# Patient Record
Sex: FEMALE | Race: WHITE
[De-identification: names, ages, dates, MRNs, and addresses within clinical notes are randomized per-mention and may not be internally consistent; named-entity substitution may affect disease eponyms.]

---

## 2018-03-24 ENCOUNTER — HOSPITAL ENCOUNTER (OUTPATIENT)
Dept: HOSPITAL 80 - FIMAGING | Age: 73
End: 2018-03-24
Attending: NURSE PRACTITIONER
Payer: COMMERCIAL

## 2018-03-24 DIAGNOSIS — M25.551: Primary | ICD-10-CM

## 2018-03-24 DIAGNOSIS — M25.451: ICD-10-CM

## 2018-03-24 DIAGNOSIS — Z98.890: ICD-10-CM

## 2018-03-24 DIAGNOSIS — M79.1: ICD-10-CM

## 2018-03-24 DIAGNOSIS — M25.452: ICD-10-CM

## 2018-04-16 ENCOUNTER — HOSPITAL ENCOUNTER (OUTPATIENT)
Dept: HOSPITAL 80 - FIMAGING | Age: 73
End: 2018-04-16
Attending: RADIOLOGY
Payer: COMMERCIAL

## 2018-04-16 DIAGNOSIS — M25.551: Primary | ICD-10-CM

## 2018-05-14 ENCOUNTER — HOSPITAL ENCOUNTER (OUTPATIENT)
Dept: HOSPITAL 80 - FIMAGING | Age: 73
End: 2018-05-14
Attending: RADIOLOGY
Payer: COMMERCIAL

## 2018-05-14 DIAGNOSIS — M25.551: Primary | ICD-10-CM

## 2018-05-14 PROCEDURE — 3E023KZ INTRODUCTION OF OTHER DIAGNOSTIC SUBSTANCE INTO MUSCLE, PERCUTANEOUS APPROACH: ICD-10-PCS | Performed by: RADIOLOGY

## 2018-06-13 ENCOUNTER — HOSPITAL ENCOUNTER (EMERGENCY)
Dept: HOSPITAL 80 - FED | Age: 73
Discharge: HOME | End: 2018-06-13
Payer: COMMERCIAL

## 2018-06-13 VITALS — DIASTOLIC BLOOD PRESSURE: 69 MMHG | SYSTOLIC BLOOD PRESSURE: 172 MMHG
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
## 2018-06-13 DIAGNOSIS — Z79.82: ICD-10-CM

## 2018-06-13 DIAGNOSIS — I10: ICD-10-CM

## 2018-06-13 DIAGNOSIS — S09.90XA: Primary | ICD-10-CM
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
## 2018-06-13 NOTE — EDPHY
H & P


Stated Complaint: Fell and hit left side of face, injury to face and left knee.


Time Seen by Provider: 06/13/18 14:04


HPI/ROS: 





CHIEF COMPLAINT:  Frontal head injury, post mechanical fall





HISTORY OF PRESENT ILLNESS:  73-year-old female arrives via private vehicle 

after she tripped on her driveway impacting the left frontal region of her 

head.  No loss of consciousness.  No seizure activity.  No history of anti 

coagulant use.  This was a mechanical non syncopal episode.  She is complaining 

of left knee and left elbow abrasion as well as left frontal head injury.  She 

is able bear weight on her left knee and denies left knee or left elbow pain.  

Denies midline C-spine pain.  Denies peripheral paresthesia, weakness, numbness

, nausea, vomiting, back pain, chest pain or trauma, abdominal pain or trauma, 

dyspnea.





PRIMARY CARE PROVIDER: Dr. Fay French 





REVIEW OF SYSTEMS:


A ten point review of systems was performed and is negative with the exception 

of the items mentioned in the HPI








PAST MEDICAL/SURGICAL HISTORY: no anticoagulant use,.  History of hypertension, 

hypothyroid, chronic pain





SOCIAL HISTORY: denies alcohol use at time of incident





*********





PHYSICAL EXAM 





1) GENERAL: Well-developed, well-nourished, alert and oriented.  Appears to be 

in no acute distress. Answering questions appropriately.


2) HEAD: Normocephalic, left frontal hematoma and ecchymosis


3) HEENT: Pupils equal, round, reactive to light bilaterally. Negative Horners. 

Nasopharynx, oropharynx, clear.   No deformity or angulation of nose.  No 

septal hematoma.  No rhinorrhea. No oral trauma. Ears bilaterally with normal 

tympanic membranes.  No hemotympanum.  No fluid or blood in the external 

auditory canal.  No raccoon eyes.  No Lau sign.  Teeth are normally aligned 

with no gross malocclusion, TMJ bilaterally nontender, facial bones nontender 

including the zygomatic arch, maxilla mandible.


4) NECK:  No cervical collar is on. Posterior cervical spine is nontender, no 

stepoff, no effusion. Full range of motion which does not elicit any midline 

cervical spine pain, no posterior midline tenderness, no step-off.


5) LUNGS: Clear to auscultation bilaterally, no wheezes, no rhonchi, no 

retractions.  No obvious signs of trauma.  No chest wall pain.  No flaring, no 

grunting.  Moving symmetrically.  No crepitus. 


6) HEART: [Regular rate and rhythm, 


7) ABDOMEN: No guarding, no rebound, no focal tenderness, no peritoneal signs, 

no signs of trauma, no ecchymosis


8) MUSCULOSKELETAL:  Left upper extremity:  Dorsal left elbow abrasion with 

full pain-free range of motion.  No radial head pain.  Proximally and distally 

nontender.  Left lower extremity:  Left anterior knee abrasion with full pain-

free range of motion, full weight-bearing.  No hip pain.  No inguinal or 

acetabular pain.  Otherwise, moving all extremities.


9) BACK:  No midline vertebral tenderness, no fluctuance, no step-off, no 

obvious trauma, no visual or palpable abnormality.


10) SKIN:  No laceration.


***********





DIFFERENTIAL DIAGNOSIS:  Not necessarily in any particular order, my 

differential diagnosis includes, but is not limited to, concussion, skull 

fracture, intraparenchymal contusion, subarachnoid, subdural and epidural 

hematoma.  The patient understands that this diagnosis is provisional and can 

never be 100% accurate.





- Personal History


Current Tetanus Diphtheria and Acellular Pertussis (TDAP): Yes





- Medical/Surgical History


Hx Asthma: No


Hx Chronic Respiratory Disease: No


Hx Diabetes: No


Hx Cardiac Disease: No


Hx Renal Disease: No


Hx Cirrhosis: No


Hx Alcoholism: No


Hx HIV/AIDS: No


Hx Splenectomy or Spleen Trauma: No


Other PMH: HTN. Hypothyroid. Chronic pain.





- Social History


Smoking Status: Never smoked


Constitutional: 


 Initial Vital Signs











Temperature (C)  36.5 C   06/13/18 13:10


 


Heart Rate  63   06/13/18 13:10


 


Respiratory Rate  18   06/13/18 13:10


 


Blood Pressure  183/70 H  06/13/18 13:10


 


O2 Sat (%)  97   06/13/18 13:10








 











O2 Delivery Mode               Room Air














Allergies/Adverse Reactions: 


 





erythromycin base [Erythromycin Base] Allergy (Mild, Verified 11/07/13 15:37)


 








Home Medications: 














 Medication  Instructions  Recorded


 


Aspirin EC [Aspirin EC 81 mg (OTC)] 81 mg PO DAILY 11/10/13


 


Atenolol 25 mg PO DAILY 11/10/13


 


Hydrocodone/Acetaminophen 1 each PO Q4-6PRN PRN 11/10/13





[Hydrocodon-Acetaminophn ]  


 


Irbesartan 150 mg PO DAILY 11/10/13


 


Levothyroxine [Synthroid 125 mcg 125 mcg PO MOTUWETHFRSA@0600 11/10/13





(RX)]  


 


Lorazepam [Ativan] 1 mg PO Q8 PRN 11/10/13


 


Oxycodone HCl [Oxycontin] 40 mg PO TID 11/10/13


 


Pregabalin [Lyrica] 25 mg PO BID 11/10/13


 


Zolpidem Tartrate [Ambien Cr] 12.5 mg PO HS 11/10/13














Medical Decision Making





- Diagnostics


Imaging Results: 


 Imaging Impressions





Head CT  06/13/18 14:12


Impression:


1.  Left periorbital extracranial hematoma.


2.  No skull fracture.


3.  No intracranial hemorrhage.


4.  Mild atrophy and mild microvascular ischemic gliosis.


5.  Cerebrovascular atherosclerosis.


 


Findings and recommendations discussed with Emergency Department Physician 

Assistant, Chano Magallon PA-C, at 1518 hours, on June 13, 2018.


 


Final report concurs with initial preliminary interpretation.








Images reviewed myself


ED Course/Re-evaluation: 





2:14 p.m.:  Head CT ordered in this patient for trauma for the following 

indication:  Greater than 65 years old.





3:42 p.m.:  Re-evaluation, patient is answering questions appropriately.  

Discussed her negative CT imaging results.  She was here the .  She 

feels comfortable being discharged.  I think she can be discharged with usual 

and customary head injury precautions and instructions.  I saw this patient 

independently based on established practice protocols.  Care of patient under 

supervision of secondary supervising physician Dr Monsalve





Departure





- Departure


Disposition: Home, Routine, Self-Care


Clinical Impression: 


Head injury due to trauma


Qualifiers:


 Encounter type: initial encounter Qualified Code(s): S09.90XA - Unspecified 

injury of head, initial encounter





Condition: Good


Instructions:  Head Injury (ED)


Additional Instructions: 


ALTHOUGH THERE IS NO EVIDENCE OF SERIOUS HEAD INJURY AT THIS TIME, DELAYED 

SIGNS CAN APPEAR 24 TO 48 HOURS AFTER INJURY.  PLEASE RETURN TO THE EMERGENCY 

DEPARTMENT (ED) IMMEDIATELY IF YOU HAVE INCREASED HEADACHE, PERSISTENT HEADACHE

, VOMITING, WEAKNESS, CONFUSION OR VISUAL PROBLEMS.  WE RECOMMEND THAT YOU DO 

NOT RESUME CONTACT SPORTS OR ACTIVITIES THAT TAKE COORDINATION OR BALANCE SUCH 

AS SKIING OR RIDING A BICYCLE UNTIL CLEARED TO DO SO BY YOUR DOCTOR OR BY A 

NEUROLOGIST.


Referrals: 


Fay French MD [Primary Care Provider] - 1-2 days without fail

## 2019-03-18 ENCOUNTER — HOSPITAL ENCOUNTER (OUTPATIENT)
Dept: HOSPITAL 80 - FIMAGING | Age: 74
End: 2019-03-18
Attending: GENERAL ACUTE CARE HOSPITAL
Payer: COMMERCIAL

## 2019-03-18 DIAGNOSIS — I25.10: ICD-10-CM

## 2019-03-18 DIAGNOSIS — I65.23: Primary | ICD-10-CM

## 2019-03-18 PROCEDURE — G0483 DRUG TEST DEF 22+ CLASSES: HCPCS
